# Patient Record
Sex: MALE | Race: WHITE | URBAN - METROPOLITAN AREA
[De-identification: names, ages, dates, MRNs, and addresses within clinical notes are randomized per-mention and may not be internally consistent; named-entity substitution may affect disease eponyms.]

---

## 2018-05-07 ENCOUNTER — OFFICE VISIT (OUTPATIENT)
Dept: FAMILY MEDICINE | Facility: CLINIC | Age: 23
End: 2018-05-07
Payer: COMMERCIAL

## 2018-05-07 VITALS
HEART RATE: 92 BPM | SYSTOLIC BLOOD PRESSURE: 122 MMHG | TEMPERATURE: 102.2 F | WEIGHT: 128 LBS | HEIGHT: 65 IN | OXYGEN SATURATION: 99 % | DIASTOLIC BLOOD PRESSURE: 68 MMHG | BODY MASS INDEX: 21.33 KG/M2

## 2018-05-07 DIAGNOSIS — J03.90 TONSILLITIS: ICD-10-CM

## 2018-05-07 DIAGNOSIS — R50.9 FEVER, UNSPECIFIED FEVER CAUSE: ICD-10-CM

## 2018-05-07 DIAGNOSIS — J02.9 SORE THROAT: Primary | ICD-10-CM

## 2018-05-07 LAB
BASOPHILS # BLD AUTO: 0 10E9/L (ref 0–0.2)
BASOPHILS NFR BLD AUTO: 0.2 %
DEPRECATED S PYO AG THROAT QL EIA: NORMAL
DIFFERENTIAL METHOD BLD: ABNORMAL
EOSINOPHIL # BLD AUTO: 0 10E9/L (ref 0–0.7)
EOSINOPHIL NFR BLD AUTO: 0.4 %
ERYTHROCYTE [DISTWIDTH] IN BLOOD BY AUTOMATED COUNT: 11.7 % (ref 10–15)
FLUAV+FLUBV AG SPEC QL: NEGATIVE
FLUAV+FLUBV AG SPEC QL: NEGATIVE
HCT VFR BLD AUTO: 44.8 % (ref 40–53)
HETEROPH AB SER QL: NEGATIVE
HGB BLD-MCNC: 15.6 G/DL (ref 13.3–17.7)
LYMPHOCYTES # BLD AUTO: 2.8 10E9/L (ref 0.8–5.3)
LYMPHOCYTES NFR BLD AUTO: 29.7 %
MCH RBC QN AUTO: 29.9 PG (ref 26.5–33)
MCHC RBC AUTO-ENTMCNC: 34.8 G/DL (ref 31.5–36.5)
MCV RBC AUTO: 86 FL (ref 78–100)
MONOCYTES # BLD AUTO: 1.4 10E9/L (ref 0–1.3)
MONOCYTES NFR BLD AUTO: 14.3 %
NEUTROPHILS # BLD AUTO: 5.3 10E9/L (ref 1.6–8.3)
NEUTROPHILS NFR BLD AUTO: 55.4 %
PLATELET # BLD AUTO: 238 10E9/L (ref 150–450)
RBC # BLD AUTO: 5.21 10E12/L (ref 4.4–5.9)
SPECIMEN SOURCE: NORMAL
SPECIMEN SOURCE: NORMAL
WBC # BLD AUTO: 9.5 10E9/L (ref 4–11)

## 2018-05-07 PROCEDURE — 87081 CULTURE SCREEN ONLY: CPT | Performed by: PHYSICIAN ASSISTANT

## 2018-05-07 PROCEDURE — 36415 COLL VENOUS BLD VENIPUNCTURE: CPT | Performed by: PHYSICIAN ASSISTANT

## 2018-05-07 PROCEDURE — 87880 STREP A ASSAY W/OPTIC: CPT | Performed by: PHYSICIAN ASSISTANT

## 2018-05-07 PROCEDURE — 99203 OFFICE O/P NEW LOW 30 MIN: CPT | Performed by: PHYSICIAN ASSISTANT

## 2018-05-07 PROCEDURE — 87804 INFLUENZA ASSAY W/OPTIC: CPT | Performed by: PHYSICIAN ASSISTANT

## 2018-05-07 PROCEDURE — 85025 COMPLETE CBC W/AUTO DIFF WBC: CPT | Performed by: PHYSICIAN ASSISTANT

## 2018-05-07 PROCEDURE — 86308 HETEROPHILE ANTIBODY SCREEN: CPT | Performed by: PHYSICIAN ASSISTANT

## 2018-05-07 NOTE — PROGRESS NOTES
SUBJECTIVE:                                                    Endy Levy is a 22 year old male who presents to clinic today for the following health issues:      Acute Illness   Acute illness concerns: Fever, sore throat, nausea  Onset: x4 days    Fever: YES- curr 102.2 - 100.5-104-2 days ago    Chills/Sweats: YES- both    Headache (location?): YES- mild    Sinus Pressure:no    Conjunctivitis:  no    Ear Pain: no    Rhinorrhea: no    Congestion: no    Sore Throat: YES- constant     Cough: YES-non-productive    Wheeze: no    Decreased Appetite: YES    Nausea: YES- mild    Vomiting: no    Diarrhea:  YES- entire time    Dysuria/Freq.: no    Fatigue/Achiness: YES- more tired    Sick/Strep Exposure: no     Therapies Tried and outcome: advil - moderate relief    Patient reports that the symptoms started on Thursday evening with a fever of 101.  He reports that the day after he had a fever and took an Advil.  He was out all day and felt fine.  In the evening he and his boyfriend went to a movie, that night he felt ok, but had a fever of 104.  On Saturday he started to get a sore throat, that gets worse with swallowing.  He feels well with Advil onboard.  He went out on Saturday night and felt great.  Sunday he woke up with a fever and stayed in bed all day.  Today he woke up with a fever and has been resting today as well.  He reports that the coughing started on Saturday.  Not much for coughing.         He denies shortness of breath or wheezing.      Problem list and histories reviewed & adjusted, as indicated.  Additional history: as documented      ROS:  Constitutional, HEENT, cardiovascular, pulmonary, GI, , musculoskeletal, neuro, skin, endocrine and psych systems are negative, except as otherwise noted.    OBJECTIVE:                                                    /68 (BP Location: Left arm, Patient Position: Chair, Cuff Size: Adult Regular)  Pulse 92  Temp 102.2  F (39  " C) (Oral)  Ht 5' 5\" (1.651 m)  Wt 128 lb (58.1 kg)  SpO2 99%  BMI 21.3 kg/m2  Body mass index is 21.3 kg/(m^2).  GENERAL: healthy, alert and no distress  EYES: Eyes grossly normal to inspection, PERRL and conjunctivae and sclerae normal  HENT: ear canals and TM's normal, nose and mouth without ulcers or lesions, mild erythema of pharynx noted with exudates on tonsils bilaterally, airway is non-obstructed and no edema or sign of abscess noted.    NECK: no adenopathy, no asymmetry, masses, or scars and thyroid normal to palpation  RESP: lungs clear to auscultation - no rales, rhonchi or wheezes  CV: regular rate and rhythm, normal S1 S2, no S3 or S4, no murmur, click or rub, no peripheral edema and peripheral pulses strong  MS: no gross musculoskeletal defects noted, no edema  NEURO: Normal strength and tone, mentation intact and speech normal  PSYCH: mentation appears normal, affect normal/bright    Diagnostic Test Results:  Results for orders placed or performed in visit on 05/07/18   Mononucleosis screen   Result Value Ref Range    Mononucleosis Screen Negative NEG^Negative   CBC with platelets differential   Result Value Ref Range    WBC 9.5 4.0 - 11.0 10e9/L    RBC Count 5.21 4.4 - 5.9 10e12/L    Hemoglobin 15.6 13.3 - 17.7 g/dL    Hematocrit 44.8 40.0 - 53.0 %    MCV 86 78 - 100 fl    MCH 29.9 26.5 - 33.0 pg    MCHC 34.8 31.5 - 36.5 g/dL    RDW 11.7 10.0 - 15.0 %    Platelet Count 238 150 - 450 10e9/L    Diff Method Automated Method     % Neutrophils 55.4 %    % Lymphocytes 29.7 %    % Monocytes 14.3 %    % Eosinophils 0.4 %    % Basophils 0.2 %    Absolute Neutrophil 5.3 1.6 - 8.3 10e9/L    Absolute Lymphocytes 2.8 0.8 - 5.3 10e9/L    Absolute Monocytes 1.4 (H) 0.0 - 1.3 10e9/L    Absolute Eosinophils 0.0 0.0 - 0.7 10e9/L    Absolute Basophils 0.0 0.0 - 0.2 10e9/L   Strep, Rapid Screen   Result Value Ref Range    Specimen Description Throat     Rapid Strep A Screen       NEGATIVE: No Group A streptococcal " antigen detected by immunoassay, await culture report.   Beta strep group A culture   Result Value Ref Range    Specimen Description Throat     Culture Micro No beta hemolytic Streptococcus Group A isolated    Influenza A/B antigen   Result Value Ref Range    Influenza A/B Agn Specimen Nasopharyngeal     Influenza A Negative NEG^Negative    Influenza B Negative NEG^Negative        ASSESSMENT/PLAN:                                                      Endy was seen today for new patient, fever, nausea and pharyngitis.    Diagnoses and all orders for this visit:    Sore throat  -     Strep, Rapid Screen  -     Beta strep group A culture  -     Mononucleosis screen  -     CBC with platelets differential  -     Influenza A/B antigen    Fever, unspecified fever cause  -     Strep, Rapid Screen  -     Beta strep group A culture  -     Mononucleosis screen  -     CBC with platelets differential  -     Influenza A/B antigen    Tonsillitis  -     amoxicillin-clavulanate (AUGMENTIN) 875-125 MG per tablet; Take 1 tablet by mouth 2 times daily For 10 days    - Patient treated for tonsillitis due to continued fever, and exam findings of erythema and exudates on tonsils bilaterally.  Airway is non-obstructed and does not show sign of abscess.   - Patient advised to followup in 1-2 days if not improving.  He should seek more immediate medical attention if symptoms change or worsen in any way.     -- I have discussed the patient's diagnosis, and my plan of treatment with the patient and/or family. Patient is aware to followup if symptoms do not improve.  Patient has been advised to be seen sooner or seek more immediate care if symptoms change or worsen.  Patient agrees with and understands the plan today.     -- Over 30 minutes spent with the patient and over 50% of the time spent on counseling and coordination of care regarding above issue.       See Patient Instructions        Joann Collier PA-C    [facility]  [place]

## 2018-05-07 NOTE — MR AVS SNAPSHOT
"              After Visit Summary   5/7/2018    Endy Levy    MRN: [id number]           Patient Information     Date Of Birth          1995        Visit Information        Provider Department      5/7/2018 6:20 PM Joann Collier PA-C [facility]        Today's Diagnoses     Sore throat    -  1    Fever, unspecified fever cause        Tonsillitis          Care Instructions    Please take the antibiotic for the tonsillitis.      Please followup if you are not improving.  Please seek more immediate care if symptoms change or worsen in any way.            Follow-ups after your visit        Follow-up notes from your care team     Return in about 2 days (around 5/9/2018) for If not improving, please be seen sooner if needed.      Who to contact     If you have questions or need follow up information about today's clinic visit or your schedule please contact [facility] directly at [phone].  Normal or non-critical lab and imaging results will be communicated to you by MyChart, letter or phone within 4 business days after the clinic has received the results. If you do not hear from us within 7 days, please contact the clinic through MyChart or phone. If you have a critical or abnormal lab result, we will notify you by phone as soon as possible.  Submit refill requests through [employer] or call your pharmacy and they will forward the refill request to us. Please allow 3 business days for your refill to be completed.          Additional Information About Your Visit        [employer] Information     [employer] lets you send messages to your doctor, view your test results, renew your prescriptions, schedule appointments and more. To sign up, go to [email or web address] . Click on \"Log in\" on the left side of the screen, which will take you to the Welcome page. Then click on \"Sign up Now\" on the right side of the page.     You will be asked to enter the access " "code listed below, as well as some personal information. Please follow the directions to create your username and password.     Your access code is: [email or web address]  Expires: 2018  7:22 PM     Your access code will  in 90 days. If you need help or a new code, please call your [place] clinic or [phone].        Care EveryWhere ID     This is your Care EveryWhere ID. This could be used by other organizations to access your [place] medical records  [id number]        Your Vitals Were     Pulse Temperature Height Pulse Oximetry BMI (Body Mass Index)       92 102.2  F (39  C) (Oral) 5' 5\" (1.651 m) 99% 21.3 kg/m2        Blood Pressure from Last 3 Encounters:   18 122/68    Weight from Last 3 Encounters:   18 128 lb (58.1 kg)              We Performed the Following     Beta strep group A culture     CBC with platelets differential     Influenza A/B antigen     Mononucleosis screen     Strep, Rapid Screen          Today's Medication Changes          These changes are accurate as of 18  7:22 PM.  If you have any questions, ask your nurse or doctor.               Start taking these medicines.        Dose/Directions    amoxicillin-clavulanate 875-125 MG per tablet   Commonly known as:  AUGMENTIN   Used for:  Tonsillitis   Started by:  Joann Collier PA-C        Dose:  1 tablet   Take 1 tablet by mouth 2 times daily For 10 days   Quantity:  20 tablet   Refills:  0            Where to get your medicines      [facility] [address] [place] [address] AT [place] [address] & [address] [address]    Hours:  24-hours Phone:  [phone]     amoxicillin-clavulanate 875-125 MG per tablet                Primary Care Provider Office Phone # Fax #    [facility] [phone]       [address] [place] [address]        Equal Access to Services     ZHANNA WALL AH: Ann hernandez " elizabeth Slaughter, julioda lukarynadaha, qamahadta kahollie lomeli, onesimo mirlandein hayaan danielanabella nathaliealeta layemikarol lazaro. So [facility] [phone].    ATENCIÓN: Si habla español, tiene a askew disposición servicios gratuitos de asistencia lingüística. Deyvi al [phone].    We comply with applicable federal civil rights laws and [place] laws. We do not discriminate on the basis of race, color, national origin, age, disability, sex, sexual orientation, or gender identity.            Thank you!     Thank you for choosing [facility]  for your care. Our goal is always to provide you with excellent care. Hearing back from our patients is one way we can continue to improve our services. Please take a few minutes to complete the written survey that you may receive in the mail after your visit with us. Thank you!             Your Updated Medication List - Protect others around you: Learn how to safely use, store and throw away your medicines at www.disposemymeds.org.          This list is accurate as of 5/7/18  7:22 PM.  Always use your most recent med list.                   Brand Name Dispense Instructions for use Diagnosis    amoxicillin-clavulanate 875-125 MG per tablet    AUGMENTIN    20 tablet    Take 1 tablet by mouth 2 times daily For 10 days    Tonsillitis

## 2018-05-08 LAB
BACTERIA SPEC CULT: NORMAL
SPECIMEN SOURCE: NORMAL

## 2018-05-08 NOTE — PATIENT INSTRUCTIONS
Please take the antibiotic for the tonsillitis.      Please followup if you are not improving.  Please seek more immediate care if symptoms change or worsen in any way.

## 2018-05-10 ENCOUNTER — TELEPHONE (OUTPATIENT)
Dept: FAMILY MEDICINE | Facility: CLINIC | Age: 23
End: 2018-05-10

## 2018-05-10 NOTE — TELEPHONE ENCOUNTER
Reason for call:  Patient reporting a symptom    Symptom or request: Sore throat, tongue white, sores on tongue     Duration (how long have symptoms been present): 2 days     Have you been treated for this before? Yes    Additional comments: Patient states soreness has spread to mouth     Phone Number patient can be reached at:  Home number on file [phone] (home)    Best Time:  Anytime     Can we leave a detailed message on this number:  YES    Call taken on 5/10/2018 at 11:28 AM by Gay Chung

## 2018-05-10 NOTE — TELEPHONE ENCOUNTER
Routing to KR to review and advise. She had appointment with patient on 5/7/2018.  He is currently on Augmentin.    Jessica Shin, BS, RN, N  [facility]) [phone]

## 2018-05-11 NOTE — TELEPHONE ENCOUNTER
Called patient @ # below -   Patient states he has never taken Augmentin before    Patient states that part of his lower lip did swell a little bit - stated it looked like I bit my lip for a little bit (denies any swelling at this time)    DENIES: CP, SOB, Difficulty Breathing, Dizziness/Lightheadedness, Numbness/Tingling, HA, Vision/Speech Changes, N/V, swelling of tongue/throat/hands/feet, rash    advised of MORALES BRYANT message below - OV scheduled for 05/12/2018 @ 920 with MD KEO    Advised patient that if new or worsening symptoms appear (reviewed new & worsening symptoms) to call the clinic or be seen in the the ER - Patient stated an understanding and agreed with plan.      Rosa Joyner RN  [facility]

## 2018-05-11 NOTE — TELEPHONE ENCOUNTER
Please have him make an office visit to be seen.  This is a new/change symptom.  Sat should be fine.  If this is affecting his breathing he should seek immediate care.

## 2018-05-12 ENCOUNTER — OFFICE VISIT (OUTPATIENT)
Dept: FAMILY MEDICINE | Facility: CLINIC | Age: 23
End: 2018-05-12
Payer: COMMERCIAL

## 2018-05-12 VITALS
BODY MASS INDEX: 21.06 KG/M2 | HEIGHT: 65 IN | SYSTOLIC BLOOD PRESSURE: 121 MMHG | HEART RATE: 109 BPM | WEIGHT: 126.4 LBS | OXYGEN SATURATION: 99 % | TEMPERATURE: 96.3 F | DIASTOLIC BLOOD PRESSURE: 77 MMHG

## 2018-05-12 DIAGNOSIS — J03.90 TONSILLITIS: Primary | ICD-10-CM

## 2018-05-12 DIAGNOSIS — K14.6 TONGUE SORE: ICD-10-CM

## 2018-05-12 PROCEDURE — 99213 OFFICE O/P EST LOW 20 MIN: CPT | Performed by: FAMILY MEDICINE

## 2018-05-12 NOTE — PROGRESS NOTES
SUBJECTIVE:                                                    Endy Levy is a 22 year old male who presents to clinic today for the following health issues:    Acute Illness   Acute illness concerns: throat pain   Onset: 6 days     Fever: no    Chills/Sweats: no    Headache (location?): no    Sinus Pressure:no    Conjunctivitis:  no    Ear Pain: YES: left - 2 days ago    Rhinorrhea: no    Congestion: no    Sore Throat: YES- comes and goes. Better today     Cough:no    Wheeze: no    Decreased Appetite: no    Nausea: no    Vomiting: no    Diarrhea:  no    Dysuria/Freq.: no    Fatigue/Achiness: no    Sick/Strep Exposure: no     Therapies Tried and outcome: Amoxicillin - shows improvement     He was seen by Joann Collier PA-C on 05/0/18 for a similar complaint. He tested negative for mononucleosis, strep, and influenza. His CBC was unremarkable with the exception to mild lymphocytosis. He was diagnosed with tonsillitis and started on Augmentin.     Tongue Sore:  He was told to follow for his tongue sore today. He reports that it has been improving but continues to have difficulty eating and drinking.     Problem list and histories reviewed & adjusted, as indicated.  Additional history: as documented    Reviewed and updated as needed this visit by clinical staff  Tobacco  Allergies  Meds  Problems  Med Hx  Surg Hx  Fam Hx  Soc Hx        Reviewed and updated as needed this visit by Provider  Problems          ROS:  12 point ROS neg other than the symptoms noted above    This document serves as a record of the services and decisions personally performed and made by Herberth Calix MD. It was created on his behalf by Reji Dorantes, a trained medical scribe. The creation of this document is based the provider's statements to the medical scribe.  Reji Dorantes May 12, 2018 9:34 AM  OBJECTIVE:                                                    /77 (BP Location: Left arm, Patient  "Position: Chair, Cuff Size: Adult Regular)  Pulse 109  Temp 96.3  F (35.7  C) (Tympanic)  Ht 5' 5\" (1.651 m)  Wt 126 lb 6.4 oz (57.3 kg)  SpO2 99%  BMI 21.03 kg/m2  Body mass index is 21.03 kg/(m^2).   GENERAL: healthy, alert, well nourished, well hydrated, no distress  EYES: Eyes grossly normal to inspection, extraocular movements - intact, and PERRL  HENT: x2 5 mm mildly erythematous, tender patches on left side of tongue, tonsils are erythematous and edematous without exudate, otherwise, ear canals- normal; TMs- normal; Nose- normal  NECK: Anterior cervical adenopathy R>L, otherwise no tenderness, no asymmetry, no masses, no stiffness; thyroid- normal to palpation  RESP: lungs clear to auscultation - no rales, no rhonchi, no wheezes  CV: regular rates and rhythm, normal S1 S2, no S3 or S4 and no murmur, no click or rub -  ABDOMEN: soft, no tenderness, no  hepatosplenomegaly, no masses, normal bowel sounds    Diagnostic Test Results:  none      ASSESSMENT/PLAN:                                                        ICD-10-CM    1. Tonsillitis J03.90    2. Tongue sore K14.6 alum & mag hydroxide-simethicone (MYLANTA ES/MAALOX  ES) 400-400-40 MG/5ML SUSP     1- The patient seems to have improved today based on history and physical exam findings.     2- Continues to endorse tongue sores x2. Recommended Mylanta to sooth tongue sores.     Follow up with Provider - If symptoms not improving or worsening.     The information in this document, created by the medical scribe for me, accurately reflects the services I personally performed and the decisions made by me. I have reviewed and approved this document for accuracy prior to leaving the patient care area.  May 12, 2018 9:39 AM         Tung Calix MD   Pager: [phone]  [facility]    "

## 2018-05-12 NOTE — MR AVS SNAPSHOT
"              After Visit Summary   2018    Endy Levy    MRN: [id number]           Patient Information     Date Of Birth          1995        Visit Information        Provider Department      2018 9:20 AM Herberth Calix MD [facility]        Today's Diagnoses     Tonsillitis    -  1    Tongue sore           Follow-ups after your visit        Follow-up notes from your care team     Return in about 1 week (around 2018), or if symptoms worsen or fail to improve, for recheck.      Who to contact     If you have questions or need follow up information about today's clinic visit or your schedule please contact [facility] directly at [phone].  Normal or non-critical lab and imaging results will be communicated to you by [employer], letter or phone within 4 business days after the clinic has received the results. If you do not hear from us within 7 days, please contact the clinic through [employer] or phone. If you have a critical or abnormal lab result, we will notify you by phone as soon as possible.  Submit refill requests through [employer] or call your pharmacy and they will forward the refill request to us. Please allow 3 business days for your refill to be completed.          Additional Information About Your Visit        [employer] Information     [employer] lets you send messages to your doctor, view your test results, renew your prescriptions, schedule appointments and more. To sign up, go to [email or web address] . Click on \"Log in\" on the left side of the screen, which will take you to the Welcome page. Then click on \"Sign up Now\" on the right side of the page.     You will be asked to enter the access code listed below, as well as some personal information. Please follow the directions to create your username and password.     Your access code is: [email or web address]  Expires: 2018  7:22 PM     Your access code will  in 90 days. If " "you need help or a new code, please call your [place] clinic or [phone].        Care EveryWhere ID     This is your Care EveryWhere ID. This could be used by other organizations to access your [place] medical records  [id number]        Your Vitals Were     Pulse Temperature Height Pulse Oximetry BMI (Body Mass Index)       109 96.3  F (35.7  C) (Tympanic) 5' 5\" (1.651 m) 99% 21.03 kg/m2        Blood Pressure from Last 3 Encounters:   05/12/18 121/77   05/07/18 122/68    Weight from Last 3 Encounters:   05/12/18 126 lb 6.4 oz (57.3 kg)   05/07/18 128 lb (58.1 kg)              Today, you had the following     No orders found for display         Today's Medication Changes          These changes are accurate as of 5/12/18  9:38 AM.  If you have any questions, ask your nurse or doctor.               Start taking these medicines.        Dose/Directions    alum & mag hydroxide-simethicone 400-400-40 MG/5ML Susp   Commonly known as:  MYLANTA ES/MAALOX  ES   Used for:  Tongue sore   Started by:  Herberth Calix MD        Dose:  15 mL   Take 15 mLs by mouth every 4 hours as needed for indigestion   Refills:  0            Where to get your medicines      Some of these will need a paper prescription and others can be bought over the counter.  Ask your nurse if you have questions.     You don't need a prescription for these medications     alum & mag hydroxide-simethicone 400-400-40 MG/5ML Susp                Primary Care Provider Office Phone # Fax #    [place] [facility] [phone]       [address] [place] [address]        Equal Access to Services     [facility] AH: Hadii hailey Slaughter, wajennida luqadaha, qaybta kaalmaonesimo alberto. So [facility] [phone].    ATENCIÓN: Si habla español, tiene a askew disposición servicios gratuitos de asistencia lingüística. Llame al [phone].    We comply with applicable federal civil rights " laws and [place] laws. We do not discriminate on the basis of race, color, national origin, age, disability, sex, sexual orientation, or gender identity.            Thank you!     Thank you for choosing [facility]  for your care. Our goal is always to provide you with excellent care. Hearing back from our patients is one way we can continue to improve our services. Please take a few minutes to complete the written survey that you may receive in the mail after your visit with us. Thank you!             Your Updated Medication List - Protect others around you: Learn how to safely use, store and throw away your medicines at www.disposemymeds.org.          This list is accurate as of 5/12/18  9:38 AM.  Always use your most recent med list.                   Brand Name Dispense Instructions for use Diagnosis    alum & mag hydroxide-simethicone 400-400-40 MG/5ML Susp    MYLANTA ES/MAALOX  ES     Take 15 mLs by mouth every 4 hours as needed for indigestion    Tongue sore       amoxicillin-clavulanate 875-125 MG per tablet    AUGMENTIN    20 tablet    Take 1 tablet by mouth 2 times daily For 10 days    Tonsillitis